# Patient Record
Sex: MALE
[De-identification: names, ages, dates, MRNs, and addresses within clinical notes are randomized per-mention and may not be internally consistent; named-entity substitution may affect disease eponyms.]

---

## 2021-10-06 ENCOUNTER — HOSPITAL ENCOUNTER (EMERGENCY)
Dept: HOSPITAL 5 - ED | Age: 46
Discharge: HOME | End: 2021-10-06
Payer: MEDICARE

## 2021-10-06 VITALS — SYSTOLIC BLOOD PRESSURE: 148 MMHG | DIASTOLIC BLOOD PRESSURE: 96 MMHG

## 2021-10-06 DIAGNOSIS — E11.8: ICD-10-CM

## 2021-10-06 DIAGNOSIS — F17.200: ICD-10-CM

## 2021-10-06 DIAGNOSIS — G40.909: Primary | ICD-10-CM

## 2021-10-06 LAB
BASOPHILS # (AUTO): 0.1 K/MM3 (ref 0–0.1)
BASOPHILS NFR BLD AUTO: 0.8 % (ref 0–1.8)
BUN SERPL-MCNC: 9 MG/DL (ref 9–20)
BUN/CREAT SERPL: 15 %
CALCIUM SERPL-MCNC: 9.5 MG/DL (ref 8.4–10.2)
EOSINOPHIL # BLD AUTO: 0.1 K/MM3 (ref 0–0.4)
EOSINOPHIL NFR BLD AUTO: 1.3 % (ref 0–4.3)
HCT VFR BLD CALC: 40.2 % (ref 35.5–45.6)
HEMOLYSIS INDEX: 20
HGB BLD-MCNC: 13.8 GM/DL (ref 11.8–15.2)
LYMPHOCYTES # BLD AUTO: 2.9 K/MM3 (ref 1.2–5.4)
LYMPHOCYTES NFR BLD AUTO: 40.7 % (ref 13.4–35)
MCHC RBC AUTO-ENTMCNC: 34 % (ref 32–34)
MCV RBC AUTO: 99 FL (ref 84–94)
MONOCYTES # (AUTO): 0.5 K/MM3 (ref 0–0.8)
MONOCYTES % (AUTO): 7.2 % (ref 0–7.3)
PLATELET # BLD: 347 K/MM3 (ref 140–440)
RBC # BLD AUTO: 4.05 M/MM3 (ref 3.65–5.03)

## 2021-10-06 PROCEDURE — 82550 ASSAY OF CK (CPK): CPT

## 2021-10-06 PROCEDURE — 36415 COLL VENOUS BLD VENIPUNCTURE: CPT

## 2021-10-06 PROCEDURE — 99283 EMERGENCY DEPT VISIT LOW MDM: CPT

## 2021-10-06 PROCEDURE — 85025 COMPLETE CBC W/AUTO DIFF WBC: CPT

## 2021-10-06 PROCEDURE — 80048 BASIC METABOLIC PNL TOTAL CA: CPT

## 2021-10-06 NOTE — EMERGENCY DEPARTMENT REPORT
ED General Adult HPI





- General


Chief complaint: Weakness


Stated complaint: WEAKNESS/LETHARGY


Time Seen by Provider: 10/06/21 13:53


Source: patient


Mode of arrival: Ambulatory


Limitations: No Limitations





- History of Present Illness


Initial comments: 





45 YO COMES TO ER VIA EMS FROM HIS DAY PROGRAM WHERE THERE WAS REPORT THAT HE 

MAY HAVE HAD A SEIZURE. HE HAS SZ DISORDER AND IS TAKING MEDS. I'VE CALLED [facility]

HOME MANAGER AND HE VALIDATED PT IS TAKING HIS MEDS. 





NO REPORT FROM EMS WAS COMMUNICATED TO TRIAGE WHERE PT PRESENTED TO. TRIP SHEET 

NOT AVAILABLE AT THIS TIME. PER RN PT CAME FROM MAIN ED TO TRIAGE





PT A/O. HERNANDEZ. IS COOPERATIVE AND ASKING TO BUY A SODA. 





NO HI


NO SI





-: Sudden


Improves with: none


Worsens with: none


Associated Symptoms: denies other symptoms, seizure.  denies: confusion, chest 

pain, cough, diaphoresis, fever/chills, headaches, loss of appetite, malaise, 

nausea/vomiting, rash, shortness of breath


Treatments Prior to Arrival: none





- Related Data


                                    Allergies











Allergy/AdvReac Type Severity Reaction Status Date / Time


 


No Known Allergies Allergy   Unverified 01/13/21 18:04














ED Review of Systems


ROS: 


Stated complaint: WEAKNESS/LETHARGY


Other details as noted in HPI





Comment: All other systems reviewed and negative





ED Past Medical Hx





- Past Medical History


Previous Medical History?: Yes


Hx Diabetes: Yes


Hx Seizures: Yes





- Surgical History


Past Surgical History?: Yes





- Family History


Family history: no significant





- Social History


Smoking Status: Current Every Day Smoker


Substance Use Type: None





ED Physical Exam





- General


Limitations: No Limitations


General appearance: alert, in no apparent distress





- Head


Head exam: Present: atraumatic, normocephalic





- Eye


Eye exam: Present: normal appearance





- ENT


ENT exam: Present: mucous membranes moist





- Neck


Neck exam: Present: normal inspection





- Respiratory


Respiratory exam: Present: normal lung sounds bilaterally.  Absent: respiratory 

distress





- Cardiovascular


Cardiovascular Exam: Present: regular rate, normal rhythm.  Absent: systolic 

murmur, diastolic murmur, rubs, gallop





- GI/Abdominal


GI/Abdominal exam: Present: soft, normal bowel sounds





- Rectal


Rectal exam: Present: deferred





- Extremities Exam


Extremities exam: Present: normal inspection





- Back Exam


Back exam: Present: normal inspection





- Neurological Exam


Neurological exam: Present: alert, oriented X3





- Psychiatric


Psychiatric exam: Present: normal affect, normal mood





- Skin


Skin exam: Present: warm, dry, intact, normal color.  Absent: rash





ED Course


                                   Vital Signs











  10/06/21





  13:22


 


Pulse Rate 102 H


 


Respiratory 16





Rate 


 


Blood Pressure 148/96





[Left] 


 


O2 Sat by Pulse 98





Oximetry 














- Reevaluation(s)


Reevaluation #1: 





10/06/21 15:43


REMAINS IN NAD





TAKING PO











ED Medical Decision Making





- Lab Data


Result diagrams: 


                                 10/06/21 14:48








- Medical Decision Making








                                   Vital Signs











  10/06/21





  13:22


 


Pulse Rate 102 H


 


Respiratory 16





Rate 


 


Blood Pressure 148/96





[Left] 


 


O2 Sat by Pulse 98





Oximetry 











Labs











  10/06/21





  14:48


 


WBC  7.0


 


RBC  4.05


 


Hgb  13.8


 


Hct  40.2


 


MCV  99 H


 


MCH  34 H


 


MCHC  34


 


RDW  12.9 L


 


Plt Count  347


 


Lymph % (Auto)  40.7 H


 


Mono % (Auto)  7.2


 


Eos % (Auto)  1.3


 


Baso % (Auto)  0.8


 


Lymph # (Auto)  2.9


 


Mono # (Auto)  0.5


 


Eos # (Auto)  0.1


 


Baso # (Auto)  0.1


 


Seg Neutrophils %  50.0


 


Seg Neutrophils #  3.5








LABS NOTED





KEPPRA X 1 PO GIVEN 





ZAINA CALLED [facility]- PT TO BE D/C HOME WITH MANAGER. HE WILL FOLLOW UP WITH 

PCP FOR ONGOING MONITORING. 





PT HAS HAD NO SZ AND HAS BEEN COOPERATIVE, IN NAD, AND NEURO INTACT WHILE IN ER.

 


HE IS TALKING WITH OTHER PTS AND TAKING PO WITH NO DIFFICULTY 





- Differential Diagnosis


?sz today 


Critical care attestation.: 


If time is entered above; I have spent that time in minutes in the direct care 

of this critically ill patient, excluding procedure time.








ED Disposition


Clinical Impression: 


 Seizure disorder





Disposition: 01 HOME / SELF CARE / HOMELESS


Is pt being admited?: No


Does the pt Need Aspirin: No


Condition: Stable


Instructions:  Seizure, Adult


Additional Instructions: 


CONTINUE HOME MEDS





FOLLOW UP WITH PCP IN 24 HOURS FOR RECHECK AND EVALUATION OF YOUR MEDS


Referrals: 


PRIMARY CARE,MD [Primary Care Provider] - 3-5 Days


ROSALIND REYNA MD [Staff Physician] - 3-5 Days


Time of Disposition: 15:13

## 2022-01-06 ENCOUNTER — HOSPITAL ENCOUNTER (EMERGENCY)
Dept: HOSPITAL 5 - ED | Age: 47
Discharge: HOME | End: 2022-01-06
Payer: MEDICARE

## 2022-01-06 VITALS — DIASTOLIC BLOOD PRESSURE: 95 MMHG | SYSTOLIC BLOOD PRESSURE: 188 MMHG

## 2022-01-06 DIAGNOSIS — G40.909: Primary | ICD-10-CM

## 2022-01-06 DIAGNOSIS — E11.9: ICD-10-CM

## 2022-01-06 DIAGNOSIS — F17.200: ICD-10-CM

## 2022-01-06 LAB
ALBUMIN SERPL-MCNC: 4.8 G/DL (ref 3.9–5)
ALT SERPL-CCNC: 22 UNITS/L (ref 7–56)
BASOPHILS # (AUTO): 0 K/MM3 (ref 0–0.1)
BASOPHILS NFR BLD AUTO: 0.1 % (ref 0–1.8)
BILIRUB UR QL STRIP: (no result)
BLOOD UR QL VISUAL: (no result)
BUN SERPL-MCNC: 8 MG/DL (ref 9–20)
BUN/CREAT SERPL: 11 %
CALCIUM SERPL-MCNC: 9.9 MG/DL (ref 8.4–10.2)
EOSINOPHIL # BLD AUTO: 0 K/MM3 (ref 0–0.4)
EOSINOPHIL NFR BLD AUTO: 0 % (ref 0–4.3)
HCT VFR BLD CALC: 41.3 % (ref 35.5–45.6)
HEMOLYSIS INDEX: 18
HGB BLD-MCNC: 13.9 GM/DL (ref 11.8–15.2)
LYMPHOCYTES # BLD AUTO: 2 K/MM3 (ref 1.2–5.4)
LYMPHOCYTES NFR BLD AUTO: 24.5 % (ref 13.4–35)
MCHC RBC AUTO-ENTMCNC: 34 % (ref 32–34)
MCV RBC AUTO: 98 FL (ref 84–94)
MONOCYTES # (AUTO): 0.6 K/MM3 (ref 0–0.8)
MONOCYTES % (AUTO): 6.9 % (ref 0–7.3)
PH UR STRIP: 7 [PH] (ref 5–7)
PLATELET # BLD: 279 K/MM3 (ref 140–440)
PROT UR STRIP-MCNC: (no result) MG/DL
RBC # BLD AUTO: 4.2 M/MM3 (ref 3.65–5.03)
RBC #/AREA URNS HPF: < 1 /HPF (ref 0–6)
UROBILINOGEN UR-MCNC: < 2 MG/DL (ref ?–2)
WBC #/AREA URNS HPF: < 1 /HPF (ref 0–6)

## 2022-01-06 PROCEDURE — 81001 URINALYSIS AUTO W/SCOPE: CPT

## 2022-01-06 PROCEDURE — G0480 DRUG TEST DEF 1-7 CLASSES: HCPCS

## 2022-01-06 PROCEDURE — 96360 HYDRATION IV INFUSION INIT: CPT

## 2022-01-06 PROCEDURE — 85025 COMPLETE CBC W/AUTO DIFF WBC: CPT

## 2022-01-06 PROCEDURE — 36415 COLL VENOUS BLD VENIPUNCTURE: CPT

## 2022-01-06 PROCEDURE — 99284 EMERGENCY DEPT VISIT MOD MDM: CPT

## 2022-01-06 PROCEDURE — 80320 DRUG SCREEN QUANTALCOHOLS: CPT

## 2022-01-06 PROCEDURE — 80307 DRUG TEST PRSMV CHEM ANLYZR: CPT

## 2022-01-06 PROCEDURE — 80053 COMPREHEN METABOLIC PANEL: CPT

## 2022-01-06 NOTE — EMERGENCY DEPARTMENT REPORT
ED Seizure HPI





- General


Chief Complaint: Seizure


Stated Complaint: SEIZURE


Time Seen by Provider: 01/06/22 11:58


Source: patient, EMS


Mode of arrival: Stretcher


Limitations: No Limitations





- History of Present Illness


Initial Comments: 





pt has histoyr of seizure, didn;t have one but felt he will get one, takes his 

keppra and depaoke and has been compliant 


MD Complaint: possible seizure


-: hour(s)


Witnessed:: No


Trauma: No


Seizure History: known seizure disorder


Associated Symptoms: denies other symptoms





- Related Data


                                    Allergies











Allergy/AdvReac Type Severity Reaction Status Date / Time


 


No Known Allergies Allergy   Unverified 01/13/21 18:04














ED Review of Systems


ROS: 


Stated complaint: SEIZURE


Other details as noted in HPI





Constitutional: denies: chills, fever


Eyes: denies: eye pain, eye discharge, vision change


ENT: denies: ear pain, throat pain


Respiratory: denies: cough, shortness of breath, wheezing


Cardiovascular: denies: chest pain, palpitations


Endocrine: no symptoms reported


Gastrointestinal: denies: abdominal pain, nausea, diarrhea


Genitourinary: denies: urgency, dysuria


Musculoskeletal: denies: back pain, joint swelling, arthralgia


Skin: denies: rash, lesions


Neurological: denies: headache, weakness, paresthesias


Psychiatric: denies: anxiety, depression


Hematological/Lymphatic: denies: easy bleeding, easy bruising





ED Past Medical Hx





- Past Medical History


Previous Medical History?: Yes


Hx Diabetes: Yes


Hx Seizures: Yes





- Social History


Smoking Status: Current Every Day Smoker


Substance Use Type: None





ED Physical Exam





- General


Limitations: No Limitations


General appearance: alert, in no apparent distress





- Head


Head exam: Present: atraumatic, normocephalic





- Eye


Eye exam: Present: normal appearance





- ENT


ENT exam: Present: mucous membranes moist





- Neck


Neck exam: Present: normal inspection





- Respiratory


Respiratory exam: Present: normal lung sounds bilaterally.  Absent: respiratory 

distress





- Cardiovascular


Cardiovascular Exam: Present: regular rate, normal rhythm.  Absent: systolic 

murmur, diastolic murmur, rubs, gallop





- GI/Abdominal


GI/Abdominal exam: Present: soft, normal bowel sounds





- Rectal


Rectal exam: Present: deferred





- Extremities Exam


Extremities exam: Present: normal inspection





- Back Exam


Back exam: Present: normal inspection





- Neurological Exam


Neurological exam: Present: alert, oriented X3





- Psychiatric


Psychiatric exam: Present: normal affect, normal mood





- Skin


Skin exam: Present: warm, dry, intact, normal color.  Absent: rash





ED Course





                                   Vital Signs











  01/06/22 01/06/22 01/06/22





  11:57 12:49 12:50


 


Temperature 98.1 F  


 


Pulse Rate 80 94 H 


 


Respiratory 18 22 





Rate   


 


Blood Pressure 157/87 123/74 





[Left]   


 


O2 Sat by Pulse 99 95 95





Oximetry   














- Reevaluation(s)


Reevaluation #1: 





01/06/22 14:47


vss , no seizure, work up negatie pt will take his daily seizure med 





ED Medical Decision Making





- Lab Data


Result diagrams: 


                                 01/06/22 12:25





                                 01/06/22 12:25


Critical care attestation.: 


If time is entered above; I have spent that time in minutes in the direct care 

of this critically ill patient, excluding procedure time.








ED Disposition


Clinical Impression: 


 Seizure disorder





Disposition: 01 HOME / SELF CARE / HOMELESS


Is pt being admited?: No


Does the pt Need Aspirin: No


Condition: Stable


Instructions:  Seizure, Adult